# Patient Record
Sex: FEMALE | Race: BLACK OR AFRICAN AMERICAN | NOT HISPANIC OR LATINO | URBAN - METROPOLITAN AREA
[De-identification: names, ages, dates, MRNs, and addresses within clinical notes are randomized per-mention and may not be internally consistent; named-entity substitution may affect disease eponyms.]

---

## 2023-04-03 ENCOUNTER — EMERGENCY (EMERGENCY)
Facility: HOSPITAL | Age: 33
LOS: 1 days | Discharge: HOME | End: 2023-04-03
Admitting: EMERGENCY MEDICINE
Payer: MEDICAID

## 2023-04-03 VITALS
TEMPERATURE: 98 F | SYSTOLIC BLOOD PRESSURE: 115 MMHG | RESPIRATION RATE: 16 BRPM | DIASTOLIC BLOOD PRESSURE: 62 MMHG | HEART RATE: 67 BPM | OXYGEN SATURATION: 99 %

## 2023-04-03 VITALS
SYSTOLIC BLOOD PRESSURE: 124 MMHG | TEMPERATURE: 99 F | OXYGEN SATURATION: 100 % | DIASTOLIC BLOOD PRESSURE: 85 MMHG | RESPIRATION RATE: 16 BRPM | HEART RATE: 68 BPM

## 2023-04-03 DIAGNOSIS — M54.50 LOW BACK PAIN, UNSPECIFIED: ICD-10-CM

## 2023-04-03 PROCEDURE — 72131 CT LUMBAR SPINE W/O DYE: CPT | Mod: 26,MA

## 2023-04-03 PROCEDURE — 99284 EMERGENCY DEPT VISIT MOD MDM: CPT

## 2023-04-03 RX ORDER — CYCLOBENZAPRINE HYDROCHLORIDE 10 MG/1
1 TABLET, FILM COATED ORAL
Qty: 15 | Refills: 0
Start: 2023-04-03

## 2023-04-03 RX ORDER — OXYCODONE AND ACETAMINOPHEN 5; 325 MG/1; MG/1
1 TABLET ORAL
Qty: 8 | Refills: 0
Start: 2023-04-03

## 2023-04-03 RX ORDER — OXYCODONE AND ACETAMINOPHEN 5; 325 MG/1; MG/1
1 TABLET ORAL ONCE
Refills: 0 | Status: DISCONTINUED | OUTPATIENT
Start: 2023-04-03 | End: 2023-04-03

## 2023-04-03 RX ORDER — CYCLOBENZAPRINE HYDROCHLORIDE 10 MG/1
10 TABLET, FILM COATED ORAL ONCE
Refills: 0 | Status: COMPLETED | OUTPATIENT
Start: 2023-04-03 | End: 2023-04-03

## 2023-04-03 RX ORDER — LIDOCAINE 4 G/100G
1 CREAM TOPICAL ONCE
Refills: 0 | Status: COMPLETED | OUTPATIENT
Start: 2023-04-03 | End: 2023-04-03

## 2023-04-03 RX ADMIN — OXYCODONE AND ACETAMINOPHEN 1 TABLET(S): 5; 325 TABLET ORAL at 21:54

## 2023-04-03 RX ADMIN — CYCLOBENZAPRINE HYDROCHLORIDE 10 MILLIGRAM(S): 10 TABLET, FILM COATED ORAL at 21:54

## 2023-04-03 RX ADMIN — OXYCODONE AND ACETAMINOPHEN 1 TABLET(S): 5; 325 TABLET ORAL at 23:52

## 2023-04-03 RX ADMIN — LIDOCAINE 1 PATCH: 4 CREAM TOPICAL at 21:54

## 2023-04-03 NOTE — ED PROVIDER NOTE - PROGRESS NOTE DETAILS
michael - received on sign out. pt w back pain, sent from urgent care for imaging.   s/p meds at  and in ED. at time of sign out pending  CT imaging, [facility]   [facility] -  "IMPRESSION:  No evidence of traumatic malalignment or vertebral fracture. Degenerative changes as above. no severe central spinal canal stenosis or severe neural foraminal  narrowing."     imaging as above. still in pain but improved. ambulatory w steady gait. remains neurovascularly intact.  ok for dc and outpt follow up.     All results reviewed with the patient verbally. Discharge plan and return precautions d/w pt who verbalized understanding and agrees with plan. All questions answered. Vitals WNL. Ready for d/c.

## 2023-04-03 NOTE — ED PROVIDER NOTE - CLINICAL SUMMARY MEDICAL DECISION MAKING FREE TEXT BOX
32 F denies pmh p/w low back pain x 3 days, after bending down.  no direct trauma.  sent from  for CT imaging.  pt has NO red flag sxs.  no c/f cord compression.  likely msk spasm/radiculopathy.  will obtain CT as sent for that and give percocet/flexeril and lido patch and reeval

## 2023-04-03 NOTE — ED PROVIDER NOTE - PHYSICAL EXAMINATION
Vitals reviewed  Gen: uncomfortable appearing but in nad, speaking in full sentences  Skin: wwp, no rash/lesions  HEENT: ncat, eomi, mmm  Back: no midline ttp/step off, L lumbar paraspinal ttp L>R   CV: rrr, no audible m/r/g  Resp: symmetrical expansion, ctab, no w/r/r  Ext: FROM throughout, no peripheral edema, 5/5 strength all ext, SILT equal throughout, distal pulses 2+  Neuro: alert/oriented, no focal deficits, steady gait without assistance

## 2023-04-03 NOTE — ED PROVIDER NOTE - CARE PROVIDER_API CALL
River Rooney)  Orthopedics  [address] [place] [address]  Phone: [phone]  Fax: [phone]  Follow Up Time:     Edson Stover)  Orthopedics  [address] [place] [address]  Phone: [phone]  Fax: [phone]  Follow Up Time:

## 2023-04-03 NOTE — ED PROVIDER NOTE - OBJECTIVE STATEMENT
32 F denies pmh p/w low back pain x 3 days.  pt reports Saturday bent down to put on sock and had sudden sharp lower back pain causing her to lie down 2/2 pain.  she has been taking tylenol w/o improvement.  reports pain has been worsening and now w/ radiation down lateral LLE so sent to  today.  pt was given IM toradol and sent to ED via ambulance for CT imaging.  Denies fever, numbness, weakness, difficulty walking, bowel/bladder dysfunction, dysuria, hematuria, saddle anesthesia, h/o CA, h/o IVDA, fall/direct trauma

## 2023-04-03 NOTE — ED PROVIDER NOTE - NSFOLLOWUPINSTRUCTIONS_ED_ALL_ED_FT
Take tylenol 650mg or motrin 600mg for pain every 4-6 hours.  Take percocet as prescribed for severe pain.  You can also take flexeril (muscle relaxer) as prescribed for pain but do not drive/operate heavy machinery as it can make you drowsy    Please call to arrange follow up with primary care doctor within one week    You should also call to arrange follow up with spine specialist within one week     Back Pain    Back pain is very common in adults. The cause of back pain is rarely dangerous and the pain often gets better over time. The cause of your back pain may not be known and may include strain of muscles or ligaments, degeneration of the spinal disks, or arthritis. Occasionally the pain may radiate down your leg(s). Over-the-counter medicines to reduce pain and inflammation are often the most helpful. Stretching and remaining active frequently helps the healing process.     SEEK IMMEDIATE MEDICAL CARE IF YOU HAVE ANY OF THE FOLLOWING SYMPTOMS: bowel or bladder control problems, unusual weakness or numbness in your arms or legs, nausea or vomiting, abdominal pain, fever, dizziness/lightheadedness.

## 2023-04-03 NOTE — ED PROVIDER NOTE - CARE PROVIDERS DIRECT ADDRESSES
[email or web address]

## 2023-04-03 NOTE — ED PROVIDER NOTE - PATIENT PORTAL LINK FT
You can access the FollowMyHealth Patient Portal offered by [facility] by registering at the following website: [email or web address]. By joining "[employer]"’s FollowMyHealth portal, you will also be able to view your health information using other applications (apps) compatible with our system.

## 2023-04-03 NOTE — ED ADULT NURSE NOTE - CHIEF COMPLAINT QUOTE
Include Location In Plan?: No Detail Level: Zone Pt c/o left lower back pain radiating into left leg x3 days after bending down to put a sock on. Reports mild tingling sensation to left hip. Denies saddle anesthesia, loss of bowel/bladder control. Ambulates slowly due to pain. Given 30mg of Toradol by urgent care prior to arrival.

## 2023-04-03 NOTE — ED ADULT TRIAGE NOTE - CHIEF COMPLAINT QUOTE
Pt c/o left lower back pain radiating into left leg x3 days after bending down to put a sock on. Reports mild tingling sensation to left hip. Denies saddle anesthesia, loss of bowel/bladder control. Ambulates slowly due to pain. Given 30mg of Toradol by urgent care prior to arrival.

## 2023-04-03 NOTE — ED ADULT NURSE NOTE - OBJECTIVE STATEMENT
pt received into spot D A&Ox4 ambulatory appears uncomfortable BIBA from [facility] for eval of lower back pain left sided x 3 days started when bent over to put sock on. No obvious deformity swelling or discoloration. sensation intact +pedal pulse noted. Pt reports now pain radiating down the leg and feeling pins and needles numbness. Strength equal to LE no weakness out of proportion to pain. No loss of bowel or bladder Unlabored respirations noted abd nondistended medicated per orders UCG negative pending final dispo

## 2023-04-04 PROCEDURE — 72131 CT LUMBAR SPINE W/O DYE: CPT | Mod: MA

## 2023-04-04 PROCEDURE — 99284 EMERGENCY DEPT VISIT MOD MDM: CPT | Mod: 25

## 2023-04-04 RX ORDER — DIAZEPAM 5 MG
2 TABLET ORAL ONCE
Refills: 0 | Status: DISCONTINUED | OUTPATIENT
Start: 2023-04-04 | End: 2023-04-04

## 2023-04-04 RX ADMIN — Medication 2 MILLIGRAM(S): at 01:26

## 2023-04-20 NOTE — ED ADULT NURSE REASSESSMENT NOTE - NS ED NURSE REASSESS COMMENT FT1
report given to nightshift RN Lvov for continued care and final dispo pending CTr. pt sitting in chair no distress noted 21-Apr-2023 04:22 21-Apr-2023 04:41

## 2023-05-04 PROBLEM — Z00.00 ENCOUNTER FOR PREVENTIVE HEALTH EXAMINATION: Status: ACTIVE | Noted: 2023-05-04

## 2023-08-30 ENCOUNTER — NON-APPOINTMENT (OUTPATIENT)
Age: 33
End: 2023-08-30

## 2023-09-05 ENCOUNTER — NON-APPOINTMENT (OUTPATIENT)
Age: 33
End: 2023-09-05

## 2024-02-01 ENCOUNTER — NON-APPOINTMENT (OUTPATIENT)
Age: 34
End: 2024-02-01

## 2024-02-05 ENCOUNTER — NON-APPOINTMENT (OUTPATIENT)
Age: 34
End: 2024-02-05

## 2025-05-11 ENCOUNTER — NON-APPOINTMENT (OUTPATIENT)
Age: 35
End: 2025-05-11